# Patient Record
Sex: FEMALE | Employment: FULL TIME | URBAN - METROPOLITAN AREA
[De-identification: names, ages, dates, MRNs, and addresses within clinical notes are randomized per-mention and may not be internally consistent; named-entity substitution may affect disease eponyms.]

---

## 2018-11-28 PROCEDURE — 87205 SMEAR GRAM STAIN: CPT | Performed by: SURGERY

## 2018-11-28 PROCEDURE — 87070 CULTURE OTHR SPECIMN AEROBIC: CPT | Performed by: SURGERY

## 2019-12-05 ENCOUNTER — OFFICE VISIT (OUTPATIENT)
Dept: OBGYN CLINIC | Facility: CLINIC | Age: 49
End: 2019-12-05
Payer: COMMERCIAL

## 2019-12-05 VITALS
HEIGHT: 65 IN | TEMPERATURE: 98 F | BODY MASS INDEX: 19.66 KG/M2 | DIASTOLIC BLOOD PRESSURE: 82 MMHG | HEART RATE: 72 BPM | WEIGHT: 118 LBS | SYSTOLIC BLOOD PRESSURE: 142 MMHG | RESPIRATION RATE: 14 BRPM

## 2019-12-05 DIAGNOSIS — N64.4 BREAST PAIN: Primary | ICD-10-CM

## 2019-12-05 DIAGNOSIS — N63.24 BREAST LUMP ON LEFT SIDE AT 8 O'CLOCK POSITION: ICD-10-CM

## 2019-12-05 PROCEDURE — 99203 OFFICE O/P NEW LOW 30 MIN: CPT | Performed by: NURSE PRACTITIONER

## 2019-12-05 NOTE — PROGRESS NOTES
GYN H&P     2019  2:35 PM    CC: Patient is here to discuss breast concerns    HPI: Patient is a 44year old  here for exam. She states she has not had a mammogram in 10-12 years.  Typically notices pain in her breast during menses, but now she Non-medical: Not on file    Tobacco Use      Smoking status: Never Smoker      Smokeless tobacco: Never Used    Substance and Sexual Activity      Alcohol use: No      Drug use: No      Sexual activity: Not Currently    Lifestyle      Physical activity: Diagnostic mammogram with breast US ordered and number given to schedule. Advised to call office if breast pain increases or develops nipple discharge, dimpling, persistent rash on breasts, or other breast symptoms.     RTO in 2 weeks for annual exam with ANSHUL

## 2019-12-18 ENCOUNTER — OFFICE VISIT (OUTPATIENT)
Dept: OBGYN CLINIC | Facility: CLINIC | Age: 49
End: 2019-12-18
Payer: COMMERCIAL

## 2019-12-18 VITALS
HEART RATE: 68 BPM | BODY MASS INDEX: 20.16 KG/M2 | SYSTOLIC BLOOD PRESSURE: 114 MMHG | WEIGHT: 121 LBS | HEIGHT: 65 IN | RESPIRATION RATE: 12 BRPM | TEMPERATURE: 98 F | DIASTOLIC BLOOD PRESSURE: 66 MMHG

## 2019-12-18 DIAGNOSIS — Z01.419 ENCOUNTER FOR ANNUAL ROUTINE GYNECOLOGICAL EXAMINATION: Primary | ICD-10-CM

## 2019-12-18 DIAGNOSIS — Z11.51 SCREENING FOR HUMAN PAPILLOMAVIRUS: ICD-10-CM

## 2019-12-18 DIAGNOSIS — Z12.4 SCREENING FOR MALIGNANT NEOPLASM OF THE CERVIX: ICD-10-CM

## 2019-12-18 PROCEDURE — 87624 HPV HI-RISK TYP POOLED RSLT: CPT | Performed by: OBSTETRICS & GYNECOLOGY

## 2019-12-18 PROCEDURE — 88175 CYTOPATH C/V AUTO FLUID REDO: CPT | Performed by: OBSTETRICS & GYNECOLOGY

## 2019-12-18 PROCEDURE — 99386 PREV VISIT NEW AGE 40-64: CPT | Performed by: OBSTETRICS & GYNECOLOGY

## 2019-12-18 NOTE — PROGRESS NOTES
Pt here for pe/pap.   LMP:12/11/19  Last pap:11/01/10 negative  Last mammogram;Ordered by [facility]  Birth control:None

## 2019-12-18 NOTE — PROGRESS NOTES
HPI:   Eliceo Medina is a 44year old  who presents for an annual gynecological exam.   Menses: Patient's last menstrual period was 2019 (exact date). Menopause: perimenopausal  Patient states that she is new to the practice.   She has bilateral adult)   Pulse 68   Temp 97.5 °F (36.4 °C) (Oral)   Resp 12   Ht 65\"   Wt 121 lb (54.9 kg)   LMP 12/11/2019 (Exact Date)   Breastfeeding No   BMI 20.14 kg/m²   GENERAL:  Well-appearing, no apparent distress, well nourished, well developed  SKIN: Normal, n health. · I encouraged pt being compliant with screening colonoscopy at the age of 46. · I encouraged baseline Dexa if menopausal.  · I encouraged pt to have yearly annual examinations with her PCP for management of general medical problems.   · - I encou

## 2019-12-19 ENCOUNTER — TELEPHONE (OUTPATIENT)
Dept: OBGYN CLINIC | Facility: CLINIC | Age: 49
End: 2019-12-19

## 2019-12-19 DIAGNOSIS — Z12.31 BREAST CANCER SCREENING BY MAMMOGRAM: Primary | ICD-10-CM

## 2019-12-19 NOTE — TELEPHONE ENCOUNTER
PC with patient. She does not want the diagnostic mammogram. She no longer has breast pain. Saw Dr Salo Calvin yesterday. Her insurance only covers screening mammogram and she has a high deductible.  Explained when she was seen in early December she complained of

## 2019-12-19 NOTE — TELEPHONE ENCOUNTER
Patient was given an order for a diagnostic mammogram but her insurance wont cover it,. She wants an order for a regular screening mammogram instead.

## 2020-01-03 ENCOUNTER — HOSPITAL ENCOUNTER (OUTPATIENT)
Dept: MAMMOGRAPHY | Facility: HOSPITAL | Age: 50
Discharge: HOME | End: 2020-01-03
Attending: OBSTETRICS & GYNECOLOGY
Payer: COMMERCIAL

## 2020-01-03 DIAGNOSIS — Z12.31 BREAST CANCER SCREENING BY MAMMOGRAM: ICD-10-CM

## 2020-01-03 PROCEDURE — 77063 BREAST TOMOSYNTHESIS BI: CPT | Performed by: OBSTETRICS & GYNECOLOGY

## 2020-01-03 PROCEDURE — 77067 SCR MAMMO BI INCL CAD: CPT | Performed by: OBSTETRICS & GYNECOLOGY

## 2020-01-15 ENCOUNTER — HOSPITAL ENCOUNTER (OUTPATIENT)
Dept: MAMMOGRAPHY | Facility: HOSPITAL | Age: 50
Discharge: HOME | End: 2020-01-15
Attending: OBSTETRICS & GYNECOLOGY
Payer: COMMERCIAL

## 2020-01-15 DIAGNOSIS — R92.1 BREAST CALCIFICATIONS ON MAMMOGRAM: Primary | ICD-10-CM

## 2020-01-15 DIAGNOSIS — R92.2 INCONCLUSIVE MAMMOGRAM: ICD-10-CM

## 2020-01-15 PROCEDURE — 77066 DX MAMMO INCL CAD BI: CPT | Performed by: OBSTETRICS & GYNECOLOGY

## 2022-10-10 ENCOUNTER — TELEPHONE (OUTPATIENT)
Dept: OBGYN CLINIC | Facility: CLINIC | Age: 52
End: 2022-10-10

## 2022-10-10 ENCOUNTER — OFFICE VISIT (OUTPATIENT)
Dept: OBGYN CLINIC | Facility: CLINIC | Age: 52
End: 2022-10-10
Payer: COMMERCIAL

## 2022-10-10 VITALS
HEART RATE: 67 BPM | DIASTOLIC BLOOD PRESSURE: 89 MMHG | SYSTOLIC BLOOD PRESSURE: 135 MMHG | WEIGHT: 118 LBS | BODY MASS INDEX: 19.66 KG/M2 | HEIGHT: 64.96 IN

## 2022-10-10 DIAGNOSIS — N92.6 IRREGULAR BLEEDING: ICD-10-CM

## 2022-10-10 DIAGNOSIS — Z12.31 ENCOUNTER FOR SCREENING MAMMOGRAM FOR MALIGNANT NEOPLASM OF BREAST: ICD-10-CM

## 2022-10-10 DIAGNOSIS — Z01.419 WELL WOMAN EXAM: Primary | ICD-10-CM

## 2022-10-17 ENCOUNTER — TELEPHONE (OUTPATIENT)
Dept: OBGYN CLINIC | Facility: CLINIC | Age: 52
End: 2022-10-17

## 2022-10-17 NOTE — TELEPHONE ENCOUNTER
Pt aware lab orders have been placed. Pt will go tomorrow and have labs drawn. Verbalized understanding.

## 2022-10-19 ENCOUNTER — ULTRASOUND ENCOUNTER (OUTPATIENT)
Dept: OBGYN CLINIC | Facility: CLINIC | Age: 52
End: 2022-10-19
Payer: COMMERCIAL

## 2022-10-19 ENCOUNTER — TELEPHONE (OUTPATIENT)
Dept: OBGYN CLINIC | Facility: CLINIC | Age: 52
End: 2022-10-19

## 2022-10-19 ENCOUNTER — LAB ENCOUNTER (OUTPATIENT)
Dept: LAB | Age: 52
End: 2022-10-19
Attending: OBSTETRICS & GYNECOLOGY
Payer: COMMERCIAL

## 2022-10-19 DIAGNOSIS — Z01.419 WELL WOMAN EXAM: ICD-10-CM

## 2022-10-19 DIAGNOSIS — N92.6 IRREGULAR BLEEDING: ICD-10-CM

## 2022-10-19 LAB
ALBUMIN SERPL-MCNC: 4 G/DL (ref 3.4–5)
ALBUMIN/GLOB SERPL: 1.2 {RATIO} (ref 1–2)
ALP LIVER SERPL-CCNC: 57 U/L
ALT SERPL-CCNC: 26 U/L
ANION GAP SERPL CALC-SCNC: 6 MMOL/L (ref 0–18)
AST SERPL-CCNC: 16 U/L (ref 15–37)
BILIRUB SERPL-MCNC: 0.9 MG/DL (ref 0.1–2)
BUN BLD-MCNC: 14 MG/DL (ref 7–18)
BUN/CREAT SERPL: 20.9 (ref 10–20)
CALCIUM BLD-MCNC: 9.2 MG/DL (ref 8.5–10.1)
CHLORIDE SERPL-SCNC: 107 MMOL/L (ref 98–112)
CHOLEST SERPL-MCNC: 194 MG/DL (ref ?–200)
CO2 SERPL-SCNC: 24 MMOL/L (ref 21–32)
CREAT BLD-MCNC: 0.67 MG/DL
ESTRADIOL SERPL-MCNC: 114.7 PG/ML
FASTING PATIENT LIPID ANSWER: YES
FASTING STATUS PATIENT QL REPORTED: YES
FSH SERPL-ACNC: 10.7 MIU/ML
GFR SERPLBLD BASED ON 1.73 SQ M-ARVRAT: 104 ML/MIN/1.73M2 (ref 60–?)
GLOBULIN PLAS-MCNC: 3.3 G/DL (ref 2.8–4.4)
GLUCOSE BLD-MCNC: 91 MG/DL (ref 70–99)
HDLC SERPL-MCNC: 90 MG/DL (ref 40–59)
LDLC SERPL CALC-MCNC: 94 MG/DL (ref ?–100)
LH SERPL-ACNC: 8.6 MIU/ML
NONHDLC SERPL-MCNC: 104 MG/DL (ref ?–130)
OSMOLALITY SERPL CALC.SUM OF ELEC: 284 MOSM/KG (ref 275–295)
POTASSIUM SERPL-SCNC: 3.9 MMOL/L (ref 3.5–5.1)
PROT SERPL-MCNC: 7.3 G/DL (ref 6.4–8.2)
SODIUM SERPL-SCNC: 137 MMOL/L (ref 136–145)
TRIGL SERPL-MCNC: 53 MG/DL (ref 30–149)
TSI SER-ACNC: 2.69 MIU/ML (ref 0.36–3.74)
VLDLC SERPL CALC-MCNC: 9 MG/DL (ref 0–30)

## 2022-10-19 PROCEDURE — 80053 COMPREHEN METABOLIC PANEL: CPT | Performed by: OBSTETRICS & GYNECOLOGY

## 2022-10-19 PROCEDURE — 80061 LIPID PANEL: CPT | Performed by: OBSTETRICS & GYNECOLOGY

## 2022-10-19 PROCEDURE — 83002 ASSAY OF GONADOTROPIN (LH): CPT | Performed by: OBSTETRICS & GYNECOLOGY

## 2022-10-19 PROCEDURE — 83001 ASSAY OF GONADOTROPIN (FSH): CPT | Performed by: OBSTETRICS & GYNECOLOGY

## 2022-10-19 PROCEDURE — 82670 ASSAY OF TOTAL ESTRADIOL: CPT | Performed by: OBSTETRICS & GYNECOLOGY

## 2022-10-19 PROCEDURE — 84443 ASSAY THYROID STIM HORMONE: CPT | Performed by: OBSTETRICS & GYNECOLOGY

## 2022-10-24 NOTE — TELEPHONE ENCOUNTER
Pt returned call, she has further questions regarding lab results from 10/19/22. Asking why she is experiencing irregular periods if not in menopausal state. Informed pt she is not in menopausal state per Dr. Bushra Curry but may be in deja-menopause which will cause irregular periods. Reviewed remaining labs with pt; verbalizes understanding and would like to know what to do now. Pt asking to have form completed for work health assessment; informed pt she may drop off form for review but could not guarantee completion depending on requirements.

## 2022-10-25 NOTE — TELEPHONE ENCOUNTER
Spoke with pt. I let her know that if the irregular spotting is bothersome she can try Provera. She will continue to monitor and call if she decides to try Provera. Verbalized understanding.

## 2022-11-15 ENCOUNTER — TELEPHONE (OUTPATIENT)
Dept: OBGYN CLINIC | Facility: CLINIC | Age: 52
End: 2022-11-15

## 2022-11-22 ENCOUNTER — OFFICE VISIT (OUTPATIENT)
Dept: NEPHROLOGY | Facility: CLINIC | Age: 52
End: 2022-11-22
Payer: COMMERCIAL

## 2022-11-22 VITALS — BODY MASS INDEX: 20 KG/M2 | DIASTOLIC BLOOD PRESSURE: 92 MMHG | SYSTOLIC BLOOD PRESSURE: 156 MMHG | WEIGHT: 122 LBS

## 2022-11-22 DIAGNOSIS — R60.9 EDEMA, UNSPECIFIED TYPE: Primary | ICD-10-CM

## 2022-11-22 PROCEDURE — 3077F SYST BP >= 140 MM HG: CPT | Performed by: INTERNAL MEDICINE

## 2022-11-22 PROCEDURE — 99244 OFF/OP CNSLTJ NEW/EST MOD 40: CPT | Performed by: INTERNAL MEDICINE

## 2022-11-22 PROCEDURE — 3080F DIAST BP >= 90 MM HG: CPT | Performed by: INTERNAL MEDICINE

## 2023-01-13 ENCOUNTER — TELEPHONE (OUTPATIENT)
Facility: CLINIC | Age: 53
End: 2023-01-13

## 2024-03-30 ENCOUNTER — APPOINTMENT (OUTPATIENT)
Dept: GENERAL RADIOLOGY | Facility: HOSPITAL | Age: 54
End: 2024-03-30
Attending: EMERGENCY MEDICINE
Payer: COMMERCIAL

## 2024-03-30 ENCOUNTER — HOSPITAL ENCOUNTER (EMERGENCY)
Facility: HOSPITAL | Age: 54
Discharge: HOME | End: 2024-03-30
Attending: EMERGENCY MEDICINE
Payer: COMMERCIAL

## 2024-03-30 VITALS
DIASTOLIC BLOOD PRESSURE: 86 MMHG | TEMPERATURE: 98 F | SYSTOLIC BLOOD PRESSURE: 157 MMHG | WEIGHT: 118 LBS | HEIGHT: 65 IN | HEART RATE: 63 BPM | BODY MASS INDEX: 19.66 KG/M2 | OXYGEN SATURATION: 98 % | RESPIRATION RATE: 18 BRPM

## 2024-03-30 DIAGNOSIS — F41.9 ANXIETY: ICD-10-CM

## 2024-03-30 DIAGNOSIS — M79.18 MUSCULOSKELETAL PAIN: Primary | ICD-10-CM

## 2024-03-30 PROCEDURE — 99283 EMERGENCY DEPT VISIT LOW MDM: CPT

## 2024-03-30 PROCEDURE — 73030 X-RAY EXAM OF SHOULDER: CPT | Performed by: EMERGENCY MEDICINE

## 2024-03-30 PROCEDURE — 99284 EMERGENCY DEPT VISIT MOD MDM: CPT

## 2024-03-30 RX ORDER — DIAZEPAM 5 MG/1
5 TABLET ORAL 3 TIMES DAILY PRN
Qty: 20 TABLET | Refills: 0 | Status: SHIPPED | OUTPATIENT
Start: 2024-03-30 | End: 2024-04-06

## 2024-03-30 RX ORDER — AMLODIPINE BESYLATE 2.5 MG/1
5 TABLET ORAL DAILY
COMMUNITY
Start: 2024-01-19

## 2024-03-30 NOTE — DISCHARGE INSTRUCTIONS
Spoke with primary care physician and/or do the orthopedic service for further evaluation take Advil for milder pain take diazepam for more severe pain muscle spasm.  This will make you drowsy do not drink or drive.

## 2024-03-30 NOTE — ED PROVIDER NOTES
Patient Seen in: [facility] Emergency Department      History     Chief Complaint   Patient presents with    Pain     Stated Complaint: \"I feel like my bones are out\" Body aches.    Subjective:   HPI    53-year-old  female complaining I feel like my bones are out of place.  She has multiple complaints she describes left shoulder pain that started 10 years ago and then more recently right shoulder pain she and then bilateral rib pain pelvis pain sometimes pain radiating to the left posterior aspect of her leg.  Sometimes she has some tingling throughout her body.  She went to the urgent care [facility] urgent care around  and had x-rays of both shoulders the pelvis lumbar spine and cervical spine which showed some mild degenerative changes but no fracture or anything at significantly abnormal to explain her symptoms she denies any difficulty breathing vomiting weakness feels like her hips are out of place at times.  She had somebody just her right shoulder recently and did some Chinese therapy.  Looking at her past medical records in  she had MRI of cervical thoracic spine lumbar spine.  The patient was given muscle relaxant but did not take any pulmonary visit .    Objective:   Past Medical History:   Diagnosis Date    Essential hypertension     Fibroid uterus     KIDNEY STONE     Asymptomatic    Leg swelling     Pelvicaliectasis     bilateral    Small bowel obstruction (HCC) 2010    s/p resection              Past Surgical History:   Procedure Laterality Date      1997    x 1    OTHER SURGICAL HISTORY  2010    resection of small bowel from [facility]                Social History     Socioeconomic History    Marital status: Single    Number of children: 1   Occupational History    Occupation:    Tobacco Use    Smoking status: Never    Smokeless tobacco: Never   Vaping Use    Vaping Use: Never used   Substance and Sexual Activity    Alcohol use: No    Drug use: No    Sexual  activity: Not Currently   Other Topics Concern    Caffeine Concern Yes    Exercise No    Seat Belt Yes   Social History Narrative     with children, non-smoker/drinker              Review of Systems    Positive for stated complaint: \"I feel like my bones are out\" Body aches.  Other systems are as noted in HPI.  Constitutional and vital signs reviewed.      All other systems reviewed and negative except as noted above.    Physical Exam     ED Triage Vitals [03/30/24 1708]   /84   Pulse 70   Resp 16   Temp 98 °F (36.7 °C)   Temp src Oral   SpO2 98 %   O2 Device None (Room air)       Current:/86   Pulse 63   Temp 98 °F (36.7 °C) (Oral)   Resp 18   Ht 165.1 cm (5' 5\")   Wt 53.5 kg   SpO2 98%   BMI 19.64 kg/m²         Physical Exam    Patient is alert and orient x 3 appears anxious she is able to get up and move around on the cart and move her extremities without difficulty.  HEENT exam within normal limits neck there is no lymphadenopathy or JVD she is complaining about an asymmetry of the neck I do not see this.  Is full range of motion the neck lungs are clear cardiovascular exam shows regular rate and rhythm without murmurs abdomen soft nontender the backs nontender the extremities there is no localizing tenderness full range of motion motor functions intact sensation Stach deep tendon reflexes normal the cranial nerves II through XII normal mental status is normal but appears anxious.    ED Course   Labs Reviewed - No data to display          XR SHOULDER, COMPLETE (MIN 2 VIEWS), RIGHT (CPT=73030)    Result Date: 3/30/2024  CONCLUSION:  No acute osseous findings.   LOCATION:  [facility]   Dictated by (CST): Vinicius Schaeffer MD on 3/30/2024 at 7:01 PM     Finalized by (CST): Vinicius Schaeffer MD on 3/30/2024 at 7:01 PM      Is independent reviewed there is no fracture         MDM      Initial differential diagnosis includes fracture dislocation muscular back pain lumbar radiculopathy cervical  radiculopathy anxiety peripheral neuropathy stroke    This patient has multiple orthopedic complaints with no visible abnormality on exam.  She relates that this has been going on for several years.  I advised her to follow-up with orthopedic physician and I did not find any emergent problem.  I suspect there is a significant anxiety component.                                   Medical Decision Making      Disposition and Plan     Clinical Impression:  1. Musculoskeletal pain    2. Anxiety         Disposition:  Discharge  3/30/2024  6:26 pm    Follow-up:  Nora Phan MD  [address] [place] [address]  [address] [place] [address]  [phone]    Follow up in 1 week(s)            Medications Prescribed:  Discharge Medication List as of 3/30/2024  6:31 PM        START taking these medications    Details   diazePAM 5 MG Oral Tab Take 1 tablet (5 mg total) by mouth 3 (three) times daily as needed for Anxiety., Normal, Disp-20 tablet, R-0

## 2024-03-30 NOTE — ED INITIAL ASSESSMENT (HPI)
Patient here for pelvic pain, states her body is misaligned. Most recently left hip has been bothering her 3/10 discomfort.  Endorses whole body tingling since her friend tried adjusting her right shoulder a few days ago. Speaking in full sentences, ambulating without incident.

## (undated) NOTE — MR AVS SNAPSHOT
After Visit Summary   12/18/2019    Sarai Howard    MRN: [id number]           Visit Information     Date & Time  12/18/2019  4:30 PM Provider  Olive Guthrie MD Department  Jacoby Allen, Krystle Paz, [address]  Phone  [phone]      Your Vi 3. Enter your [employer] Activation Code exactly as it appears below. You will not need to use this code after you have completed the sign-up process. If you do not sign up before the expiration date, you must request a new code.     [employer] Activation Code: 7 non-emergency, consider your options before heading to an ER.          SAME DAY  APPOINTMENTS  Available at primary care offices      [facility]     Treatment for mild  illness or inj